# Patient Record
Sex: FEMALE | Race: WHITE | NOT HISPANIC OR LATINO | URBAN - METROPOLITAN AREA
[De-identification: names, ages, dates, MRNs, and addresses within clinical notes are randomized per-mention and may not be internally consistent; named-entity substitution may affect disease eponyms.]

---

## 2023-12-13 ENCOUNTER — OFFICE VISIT (OUTPATIENT)
Dept: URGENT CARE | Facility: CLINIC | Age: 23
End: 2023-12-13
Payer: COMMERCIAL

## 2023-12-13 VITALS
DIASTOLIC BLOOD PRESSURE: 72 MMHG | SYSTOLIC BLOOD PRESSURE: 116 MMHG | BODY MASS INDEX: 23.86 KG/M2 | HEART RATE: 93 BPM | HEIGHT: 67 IN | WEIGHT: 152 LBS | RESPIRATION RATE: 16 BRPM | OXYGEN SATURATION: 97 % | TEMPERATURE: 97.6 F

## 2023-12-13 DIAGNOSIS — B34.9 ACUTE VIRAL SYNDROME: Primary | ICD-10-CM

## 2023-12-13 LAB
SARS-COV-2 AG UPPER RESP QL IA: NEGATIVE
VALID CONTROL: NORMAL

## 2023-12-13 PROCEDURE — 87811 SARS-COV-2 COVID19 W/OPTIC: CPT | Performed by: PHYSICIAN ASSISTANT

## 2023-12-13 PROCEDURE — S9083 URGENT CARE CENTER GLOBAL: HCPCS | Performed by: PHYSICIAN ASSISTANT

## 2023-12-13 PROCEDURE — 99213 OFFICE O/P EST LOW 20 MIN: CPT | Performed by: PHYSICIAN ASSISTANT

## 2023-12-13 NOTE — PROGRESS NOTES
[facility]    NAME: Kareen oRwe is a 21 y.o. female  : 2000    MRN: [id number]  DATE: 2023  TIME: 3:44 PM    Assessment and Plan   Acute viral syndrome [B34.9]  1. Acute viral syndrome  Poct Covid 19 Rapid Antigen Test        Note given for work. Patient did not want antinausea medication. Patient Instructions     Patient Instructions   Rapid COVID test is negative at this time. There are no other viral illnesses going around right now that can cause nausea/vomiting as well as sore throat, cough, nasal congestion or drainage. Bed rest.  Fluids. Tylenol as needed. Note given for work. Most people find that fever chills may last for about 3 to 5 days. If any coughing nasal congestion or drainage develop those symptoms tend to peak about 7 to 10 days after onset and then slowly resolved. Chief Complaint     Chief Complaint   Patient presents with    Vomiting     Vomiting, chills, sweats and fever since yesterday. Friend is sick contact with the same. Drinking water all day with no emesis       History of Present Illness   Kareen Rowe presents to the clinic c/o  22-year-old female comes in with fever, coughing, chills, vomiting. Started: Yesterday. Associated signs and symptoms: Nausea and vomiting. Scratchy throat. Little bit of coughing with some chest discomfort. Last vomiting last evening. Is keeping fluids down. Modifying factors: Resting, fluids, Tylenol and lots of sleeping. Known Exposures: Friend recently with sweating and vomiting. Recent travel:  No.  Hx asthma:  No.  Hx pneumonia:  No.  Smoker: Occasional if drinking alcohol. Review of Systems   Review of Systems   Constitutional:  Positive for activity change, appetite change, chills, diaphoresis, fatigue and fever.    HENT:  Negative for congestion, ear discharge, ear pain, hearing loss, mouth sores, nosebleeds, postnasal drip, rhinorrhea, sinus pressure, sinus pain and sore throat. Eyes: Negative. Respiratory:  Positive for cough. Negative for apnea, choking, chest tightness, shortness of breath, wheezing and stridor. Cardiovascular:  Negative for chest pain, palpitations and leg swelling. Gastrointestinal: Negative. Musculoskeletal:  Positive for arthralgias and myalgias. Negative for neck stiffness. Skin:  Negative for rash. Neurological:  Positive for headaches. Negative for dizziness and light-headedness. Hematological:  Positive for adenopathy. Current Medications     No long-term medications on file. Current Allergies     Allergies as of 12/13/2023    (No Known Allergies)          The following portions of the patient's history were reviewed and updated as appropriate: allergies, current medications, past family history, past medical history, past social history, past surgical history and problem list.  Past Medical History:   Diagnosis Date    PFO (patent foramen ovale)      Past Surgical History:   Procedure Laterality Date    PATENT FORAMEN OVALE CLOSURE       History reviewed. No pertinent family history. Objective   /72   Pulse 93   Temp 97.6 °F (36.4 °C) (Tympanic)   Resp 16   Ht 5' 7" (1.702 m)   Wt 68.9 kg (152 lb)   LMP 10/31/2023 Comment: irreg menses  SpO2 97%   BMI 23.81 kg/m²   Patient's last menstrual period was 10/31/2023. Physical Exam     Physical Exam  Vitals and nursing note reviewed. Constitutional:       General: She is not in acute distress. Appearance: She is well-developed. She is ill-appearing. She is not toxic-appearing or diaphoretic. Comments: Appears mildly ill but in no acute distress. No trismus or conversational dyspnea. HENT:      Head: Normocephalic and atraumatic. Right Ear: Tympanic membrane, ear canal and external ear normal.      Left Ear: Tympanic membrane, ear canal and external ear normal.      Nose: Congestion present. No rhinorrhea.       Mouth/Throat:      Pharynx: No oropharyngeal exudate or posterior oropharyngeal erythema. Comments: Cobblestoning posterior pharynx with patchy redness. Tongue is dry and coated but buccal mucosa mildly moist  Eyes:      General:         Right eye: No discharge. Left eye: No discharge. Conjunctiva/sclera: Conjunctivae normal.      Pupils: Pupils are equal, round, and reactive to light. Cardiovascular:      Rate and Rhythm: Normal rate and regular rhythm. Heart sounds: Normal heart sounds. No murmur heard. No friction rub. No gallop. Pulmonary:      Effort: Pulmonary effort is normal. No respiratory distress. Breath sounds: Normal breath sounds. No stridor. No wheezing, rhonchi or rales. Musculoskeletal:      Cervical back: Normal range of motion and neck supple. No rigidity or tenderness. Lymphadenopathy:      Cervical: No cervical adenopathy. Skin:     General: Skin is warm and dry. Coloration: Skin is not jaundiced or pale. Findings: No rash. Comments: Good turgor   Neurological:      Mental Status: She is alert and oriented to person, place, and time.    Psychiatric:         Mood and Affect: Mood normal.         Behavior: Behavior normal.

## 2023-12-13 NOTE — PATIENT INSTRUCTIONS
Rapid COVID test is negative at this time. There are no other viral illnesses going around right now that can cause nausea/vomiting as well as sore throat, cough, nasal congestion or drainage. Bed rest.  Fluids. Tylenol as needed. Note given for work. Most people find that fever chills may last for about 3 to 5 days. If any coughing nasal congestion or drainage develop those symptoms tend to peak about 7 to 10 days after onset and then slowly resolved.

## 2024-01-25 ENCOUNTER — OFFICE VISIT (OUTPATIENT)
Dept: CARDIOLOGY CLINIC | Facility: CLINIC | Age: 24
End: 2024-01-25
Payer: COMMERCIAL

## 2024-01-25 VITALS
HEART RATE: 65 BPM | WEIGHT: 160 LBS | SYSTOLIC BLOOD PRESSURE: 120 MMHG | BODY MASS INDEX: 25.06 KG/M2 | DIASTOLIC BLOOD PRESSURE: 68 MMHG

## 2024-01-25 DIAGNOSIS — R01.0 INNOCENT HEART MURMUR: ICD-10-CM

## 2024-01-25 DIAGNOSIS — Q23.3 MITRAL REGURGITATION, CONGENITAL: ICD-10-CM

## 2024-01-25 DIAGNOSIS — Z87.74 STATUS POST PATCH CLOSURE OF ASD: ICD-10-CM

## 2024-01-25 DIAGNOSIS — Q21.20 ASD (ATRIAL SEPTAL DEFECT), PRIMUM: Primary | ICD-10-CM

## 2024-01-25 DIAGNOSIS — Q23.9 CONGENITAL CLEFT LEAFLET OF MITRAL VALVE: ICD-10-CM

## 2024-01-25 PROCEDURE — 99204 OFFICE O/P NEW MOD 45 MIN: CPT | Performed by: INTERNAL MEDICINE

## 2024-01-25 PROCEDURE — 93000 ELECTROCARDIOGRAM COMPLETE: CPT | Performed by: INTERNAL MEDICINE

## 2024-01-25 NOTE — PROGRESS NOTES
[facility]  [address] [place] [address]  Phone#  [phone]   Fax#  [phone]  *-*-*-*-*-*-*-*-*-*-*-*-*-*-*-*-*-*-*-*-*-*-*-*-*-*-*-*-*-*-*-*-*-*-*-*-*-*-*-*-*-*-*-*-*-*-*-*-*-*-*-*-*-*                                              Cardiology Consultation       ENCOUNTER DATE: 24 9:22 AM  PATIENT NAME: Kady Francois   : 2000    MRN: [id number]  AGE:23 y.o.      SEX: female  ENCOUNTER PROVIDER:Jone Rojas MD     PRIMARY CARE PHYSICIAN: No primary care provider on file.    ACTIVE DIAGNOSIS THIS VISIT  1. ASD (atrial septal defect), primum  Echo complete w/ contrast if indicated      2. Status post patch closure of ASD        3. Mitral regurgitation, congenital        4. Congenital cleft leaflet of mitral valve        5. Innocent heart murmur  POCT ECG        ACTIVE PROBLEM LIST  Patient Active Problem List   Diagnosis    ASD (atrial septal defect), primum    Status post patch closure of ASD    Congenital cleft leaflet of mitral valve    Mitral regurgitation, congenital    Innocent heart murmur       CARDIOLOGY SPECIALTY COMMENTS  Kady is a 14 year old female with a recent diagnosis of incomplete atrioventricular canal defect, who presents for incomplete AV canal repair. She was initially referred to cardiology for evaluation of syncope in 2014. Initially, her syncope was evaluated by Holter, which showed no significant arrhythmia, and exercise stress test, which revealed no significant exercise-induced ectopy or myocardial ischemia. OSH echo revealed a primum ASD with mild right heart enlargement. After being referred to [facility], [facility] echo on 14 demonstrated a large primum ASD, cleft MV, no VSD, normal RA size, mild RV dilation, trivial right AV valve regurgitation, mild left AV valve regurgitation, and normal biventricular function. She was referred to surgical repair due to her ASD size and exercise intolerance. She underwent repair of her  incomplete AV canal defect on 11/18/2014.    OR with Dr Wheeler: OR 11/18/2014: MV cleft repair, pericardial patch closure    Echo 11/21/2014:  1. Normal left ventricular size and qualitatively normal systolic shortening.  2. Normal right ventricular size and qualitatively normal systolic shortening.  3. There is no residual ostium primum atrial septal defect.  4. There is mild residual regurgitation of the mitral valve post surgery.  5. There is trivial residual right atrioventricular regurgitation.  6. Trivial TV insufficiency (2.3 m/s, 21 mmHg) - normal RVPE.  7. No left and no right atrioventricular valve stenosis.  8. No left ventricular outflow tract obstruction.  9. No pericardial effusion.  10. No left and trivial right pleural effusion present.  11. S/p Incomplete atrioventricular valve canal repair.  12. S/p repair of cleft mitral valve.     7/14/2021 echocardiogram:  Report status: Final. Summary:  1. S/p Incomplete atrioventricular valve canal repair.  2. Normal left ventricular size and qualitatively normal systolic shortening.  3. Normal right ventricular size and qualitatively normal systolic shortening.  4. There is mild residual right atrioventricular regurgitation.  5. There is mild to moderate residual left atrioventricular regurgitation.  6. No left ventricular outflow tract obstruction.  7. No significant changes compared to the prior study.     Needs for SBE prophylaxis    HPI:        Patient is a 23-year-old female who underwent partial AV canal correction at [facility] with a pericardial patch closure of a atrial septal defect, primum and repair of a cleft mitral valve.  Since that time she has been asymptomatic.  She is physically active.  She denies shortness of breath.  She denies palpitations or chest discomfort.    On examination she has no murmur and there are no unusual cardiac motions noted on palpation of her precordium.    She is interested in eventually being pregnant and wondered whether  her congenital heart disease would have affected pregnancy.    Records reviewed from [facility] and from her follow-up up until 2017.    DISCUSSION/PLAN:         Echo in [place] with Dr. Beavers  Return in 6 months      Results for orders placed or performed in visit on 01/25/24   POCT ECG    Narrative    Normal sinus rhythm at a rate of 65 bpm.  Left anterior hemiblock.  Abnormal EKG.       No current outpatient medications on file.  No Known Allergies    Past Medical History:   Diagnosis Date    PFO (patent foramen ovale)      Social History     Socioeconomic History    Marital status: Single     Spouse name: Not on file    Number of children: Not on file    Years of education: Not on file    Highest education level: Not on file   Occupational History    Not on file   Tobacco Use    Smoking status: Never    Smokeless tobacco: Never   Vaping Use    Vaping status: Never Used   Substance and Sexual Activity    Alcohol use: Never    Drug use: Never    Sexual activity: Not on file   Other Topics Concern    Not on file   Social History Narrative    Not on file     Social Determinants of Health     Financial Resource Strain: Not on file   Food Insecurity: Not on file   Transportation Needs: Not on file   Physical Activity: Not on file   Stress: Not on file   Social Connections: Not on file   Intimate Partner Violence: Not on file   Housing Stability: Not on file      No family history on file.  Past Surgical History:   Procedure Laterality Date    PATENT FORAMEN OVALE CLOSURE         Review of Systems:  Review of Systems   Constitutional: Negative.    HENT: Negative.     Respiratory:  Negative for cough, choking, chest tightness, shortness of breath, wheezing and stridor.    Cardiovascular:  Negative for chest pain, palpitations and leg swelling.   Gastrointestinal: Negative.    Endocrine: Negative.    Genitourinary: Negative.    Musculoskeletal: Negative.    Skin: Negative.    Allergic/Immunologic: Negative.     Neurological: Negative.    Hematological: Negative.    Psychiatric/Behavioral: Negative.         Physical Exam:  /68 (BP Location: Left arm, Patient Position: Sitting, Cuff Size: Adult)   Pulse 65   Wt 72.6 kg (160 lb)   BMI 25.06 kg/m²     PREVIOUS WEIGHTS:   Wt Readings from Last 10 Encounters:   01/25/24 72.6 kg (160 lb)   12/13/23 68.9 kg (152 lb)      Physical Exam  Constitutional:       General: She is not in acute distress.     Appearance: She is well-developed.   HENT:      Head: Normocephalic and atraumatic.   Neck:      Thyroid: No thyromegaly.      Vascular: No carotid bruit or JVD.      Trachea: No tracheal deviation.   Cardiovascular:      Rate and Rhythm: Normal rate and regular rhythm.      Pulses: Normal pulses.      Heart sounds: Normal heart sounds. No murmur heard.     No friction rub. No gallop.   Pulmonary:      Effort: Pulmonary effort is normal. No respiratory distress.      Breath sounds: Normal breath sounds. No wheezing, rhonchi or rales.   Chest:      Chest wall: No tenderness.   Abdominal:      General: Bowel sounds are normal. There is no distension.      Palpations: Abdomen is soft.      Tenderness: There is no abdominal tenderness.   Musculoskeletal:         General: Normal range of motion.      Cervical back: Normal range of motion and neck supple.      Right lower leg: No edema.      Left lower leg: No edema.   Skin:     General: Skin is warm and dry.   Neurological:      General: No focal deficit present.      Mental Status: She is alert and oriented to person, place, and time.      Gait: Gait normal.   Psychiatric:         Mood and Affect: Mood normal.         Behavior: Behavior normal.         Thought Content: Thought content normal.         Judgment: Judgment normal.       ======================================================   I have personally reviewed pertinent reports.      Portions of the record may have been created with voice recognition software. Occasional  "wrong word or \"sound a like\" substitutions may have occurred due to the inherent limitations of voice recognition software. Read the chart carefully and recognize, using context, where substitutions have occurred.    SIGNATURES:   Jone Rojas MD   "

## 2024-01-26 PROBLEM — Q21.20 ASD (ATRIAL SEPTAL DEFECT), PRIMUM: Status: ACTIVE | Noted: 2024-01-26

## 2024-01-26 PROBLEM — Z87.74 STATUS POST PATCH CLOSURE OF ASD: Status: ACTIVE | Noted: 2024-01-26

## 2024-01-26 PROBLEM — Q23.3 MITRAL REGURGITATION, CONGENITAL: Status: ACTIVE | Noted: 2024-01-26

## 2024-01-26 PROBLEM — R01.0 INNOCENT HEART MURMUR: Status: ACTIVE | Noted: 2024-01-26

## 2024-01-26 PROBLEM — Q23.9 CONGENITAL CLEFT LEAFLET OF MITRAL VALVE: Status: ACTIVE | Noted: 2024-01-26

## 2024-04-12 NOTE — PROGRESS NOTES
Assessment/Plan:      PCO reviewed. Testing ordered. Will notify pt of results. Will discuss hormonal BC once results are completed.     Recommended monthly SBE and annual CBE. ASCCP guidelines reviewed and pap collected today. STI testing ordered. HSV 2 reviewed and pt opted to include in testing. She is aware that condoms are recommended with all sexual contact for STI prevention. 100% condom use encouraged. Reviewed diet/activity recommendations.   Pt became lightheaded following pap. She was discharged after remaining in office for 30 mins in stable condition.     Diagnoses and all orders for this visit:    Encounter for gynecological examination (general) (routine) with abnormal findings    Encounter for Papanicolaou smear for cervical cancer screening  -     Liquid-based pap, screening    Secondary oligomenorrhea  -     Estradiol; Future  -     Follicle stimulating hormone; Future  -     Luteinizing hormone; Future  -     Prolactin; Future  -     TSH, 3rd generation with Free T4 reflex  -     Glucose, fasting; Future  -     DHEA-sulfate; Future  -     Testosterone, free, total; Future  -     US pelvis complete w transvaginal; Future    Screening examination for STD (sexually transmitted disease)  -     Trichomonas vaginalis Thin prep  -     Chlamydia/GC amplified DNA by PCR  -     Hepatitis B surface antigen; Future  -     RPR-Syphilis Screening (Total Syphilis IGG/IGM); Future  -     HIV 1/2 AG/AB w Reflex [facility] for 2 yr old and above; Future  -     HSV TYPE 1,2 DNA PCR; Future        Subjective:      Patient ID: Radha Snyder is a 24 y.o. female.    This new patient presents for routine annual gyn exam.   Pt states since the onset of menses, she has had long intervals between menses. She has never been tested for PCO. She has hx of acne. She was on an OCP for 5 years, stopped once she started a long term relationship with a female partner. Would like to restart as she is now has a male partner. She is not  "using condoms or BC. LMP 3/18/24. Reports 3 partners in the last 12 months. Requests STI testing.   She denies breast concerns, abn discharge, pelvic pain, bowel/bladder dysfunction.   Believes she had a pap in her teens.   Gardasil, believes she completed.         The following portions of the patient's history were reviewed and updated as appropriate: allergies, current medications, past family history, past medical history, past social history, past surgical history and problem list.    Review of Systems   Constitutional: Negative.    HENT: Negative.     Respiratory: Negative.     Cardiovascular: Negative.    Gastrointestinal: Negative.    Endocrine: Negative.    Genitourinary:  Positive for menstrual problem. Negative for difficulty urinating, dyspareunia, dysuria, frequency, pelvic pain, urgency, vaginal bleeding, vaginal discharge and vaginal pain.   Musculoskeletal: Negative.    Skin: Negative.    Neurological: Negative.    Psychiatric/Behavioral: Negative.           Objective:      /67   Ht 5' 7\" (1.702 m)   Wt 70.3 kg (155 lb)   LMP 03/18/2024 (Exact Date)   BMI 24.28 kg/m²          Physical Exam  Vitals and nursing note reviewed. Exam conducted with a chaperone present.   Constitutional:       Appearance: Normal appearance. She is well-developed.   HENT:      Head: Normocephalic and atraumatic.   Neck:      Thyroid: No thyroid mass or thyromegaly.   Cardiovascular:      Rate and Rhythm: Normal rate and regular rhythm.      Heart sounds: Normal heart sounds.   Pulmonary:      Effort: Pulmonary effort is normal.      Breath sounds: Normal breath sounds.   Chest:   Breasts:     Breasts are symmetrical.      Right: No inverted nipple, mass, nipple discharge, skin change or tenderness.      Left: No inverted nipple, mass, nipple discharge, skin change or tenderness.   Abdominal:      General: Bowel sounds are normal.      Palpations: Abdomen is soft.      Tenderness: There is no abdominal tenderness. "      Hernia: There is no hernia in the left inguinal area or right inguinal area.   Genitourinary:     General: Normal vulva.      Exam position: Supine.      Pubic Area: No rash.       Labia:         Right: No rash, tenderness, lesion or injury.         Left: No rash, tenderness, lesion or injury.       Urethra: No prolapse, urethral pain, urethral swelling or urethral lesion.      Vagina: Normal. No signs of injury and foreign body. No vaginal discharge, erythema, tenderness, bleeding, lesions or prolapsed vaginal walls.      Cervix: No cervical motion tenderness, discharge, friability, lesion, erythema, cervical bleeding or eversion.      Uterus: Not deviated, not enlarged, not fixed, not tender and no uterine prolapse.       Adnexa:         Right: No mass, tenderness or fullness.          Left: No mass, tenderness or fullness.        Rectum: No external hemorrhoid.      Comments: Urethra normal without lesions  No bladder tenderness  Musculoskeletal:         General: Normal range of motion.      Cervical back: Normal range of motion and neck supple.   Lymphadenopathy:      Lower Body: No right inguinal adenopathy. No left inguinal adenopathy.   Skin:     General: Skin is warm and dry.   Neurological:      Mental Status: She is alert and oriented to person, place, and time.   Psychiatric:         Speech: Speech normal.         Behavior: Behavior normal. Behavior is cooperative.

## 2024-04-15 ENCOUNTER — OFFICE VISIT (OUTPATIENT)
Dept: GYNECOLOGY | Facility: CLINIC | Age: 24
End: 2024-04-15
Payer: COMMERCIAL

## 2024-04-15 VITALS
HEIGHT: 67 IN | BODY MASS INDEX: 24.33 KG/M2 | SYSTOLIC BLOOD PRESSURE: 118 MMHG | WEIGHT: 155 LBS | DIASTOLIC BLOOD PRESSURE: 67 MMHG

## 2024-04-15 DIAGNOSIS — N91.4 SECONDARY OLIGOMENORRHEA: ICD-10-CM

## 2024-04-15 DIAGNOSIS — Z01.411 ENCOUNTER FOR GYNECOLOGICAL EXAMINATION (GENERAL) (ROUTINE) WITH ABNORMAL FINDINGS: Primary | ICD-10-CM

## 2024-04-15 DIAGNOSIS — Z12.4 ENCOUNTER FOR PAPANICOLAOU SMEAR FOR CERVICAL CANCER SCREENING: ICD-10-CM

## 2024-04-15 DIAGNOSIS — Z11.3 SCREENING EXAMINATION FOR STD (SEXUALLY TRANSMITTED DISEASE): ICD-10-CM

## 2024-04-15 PROCEDURE — 99385 PREV VISIT NEW AGE 18-39: CPT | Performed by: OBSTETRICS & GYNECOLOGY

## 2024-04-15 PROCEDURE — 87591 N.GONORRHOEAE DNA AMP PROB: CPT | Performed by: OBSTETRICS & GYNECOLOGY

## 2024-04-15 PROCEDURE — 87661 TRICHOMONAS VAGINALIS AMPLIF: CPT | Performed by: OBSTETRICS & GYNECOLOGY

## 2024-04-15 PROCEDURE — 87491 CHLMYD TRACH DNA AMP PROBE: CPT | Performed by: OBSTETRICS & GYNECOLOGY

## 2024-04-15 PROCEDURE — G0145 SCR C/V CYTO,THINLAYER,RESCR: HCPCS | Performed by: OBSTETRICS & GYNECOLOGY

## 2024-04-17 LAB
C TRACH DNA SPEC QL NAA+PROBE: NEGATIVE
N GONORRHOEA DNA SPEC QL NAA+PROBE: NEGATIVE
T VAGINALIS DNA SPEC QL NAA+PROBE: NEGATIVE

## 2024-04-22 ENCOUNTER — HOSPITAL ENCOUNTER (OUTPATIENT)
Dept: ULTRASOUND IMAGING | Facility: HOSPITAL | Age: 24
Discharge: HOME | End: 2024-04-22
Payer: COMMERCIAL

## 2024-04-22 ENCOUNTER — TELEPHONE (OUTPATIENT)
Dept: GYNECOLOGY | Facility: CLINIC | Age: 24
End: 2024-04-22

## 2024-04-22 DIAGNOSIS — N91.4 SECONDARY OLIGOMENORRHEA: ICD-10-CM

## 2024-04-22 LAB
LAB AP GYN PRIMARY INTERPRETATION: NORMAL
Lab: NORMAL
PATH INTERP SPEC-IMP: NORMAL

## 2024-04-22 PROCEDURE — 76830 TRANSVAGINAL US NON-OB: CPT

## 2024-04-22 PROCEDURE — 76856 US EXAM PELVIC COMPLETE: CPT

## 2024-04-22 NOTE — TELEPHONE ENCOUNTER
----- Message from DAVID Delgado sent at 4/22/2024 11:17 AM EDT -----  Pls inform pt that trich, GC/CT culture were normal. Pap was normal, but did show BV. Only need to treat if sx. If she is sx, can treat with metrogel for 5 nights. If pt is not sx, should not treat because she could get a yeast infection.

## 2024-04-22 NOTE — TELEPHONE ENCOUNTER
Spoke with pt and she is aware of results of pap. She is not symptomatic at this time so no need to treat. I advised her to call if symptoms of BV come back

## 2024-05-10 LAB
DHEA-S SERPL-MCNC: 255 UG/DL (ref 110–431.7)
ESTRADIOL SERPL-MCNC: 82 PG/ML
FSH SERPL-ACNC: 2.6 MIU/ML
GLUCOSE P FAST SERPL-MCNC: 72 MG/DL (ref 70–99)
HBV SURFACE AG SERPL QL IA: NEGATIVE
HIV 1+2 AB+HIV1 P24 AG SERPL QL IA: NON REACTIVE
LH SERPL-ACNC: 10.1 MIU/ML
PROLACTIN SERPL-MCNC: 35.9 NG/ML (ref 4.8–33.4)
RPR SER QL: NON REACTIVE
TESTOST FREE SERPL-MCNC: 1.6 PG/ML (ref 0–4.2)
TESTOST SERPL-MCNC: 37 NG/DL (ref 13–71)
TSH SERPL DL<=0.005 MIU/L-ACNC: 1.01 UIU/ML (ref 0.45–4.5)

## 2024-05-13 ENCOUNTER — TELEPHONE (OUTPATIENT)
Dept: GYNECOLOGY | Facility: CLINIC | Age: 24
End: 2024-05-13

## 2024-05-13 DIAGNOSIS — E28.2 PCOS (POLYCYSTIC OVARIAN SYNDROME): Primary | ICD-10-CM

## 2024-05-13 RX ORDER — NORETHINDRONE ACETATE AND ETHINYL ESTRADIOL 1MG-20(24)
1 KIT ORAL DAILY
Qty: 84 TABLET | Refills: 0 | Status: SHIPPED | OUTPATIENT
Start: 2024-05-13

## 2024-05-13 NOTE — TELEPHONE ENCOUNTER
I spoke to pt and relayed that she meets 2/3 criteria for PCOS, oligomenorrhea and left ovary 12 mls. She did not have elevated testosterone level. Options reviewed. Pt has chosen to restart OCP. Loni sent. Risks/benefits/instructions for use reviewed.  She denies personal hx of migraine with aura, lupus, blood clot or family hx of blood clots. She will RTO in 3 months for pill check.

## 2024-07-25 DIAGNOSIS — E28.2 PCOS (POLYCYSTIC OVARIAN SYNDROME): ICD-10-CM

## 2024-07-25 RX ORDER — NORETHINDRONE ACETATE AND ETHINYL ESTRADIOL 1MG-20(24)
1 KIT ORAL DAILY
Qty: 28 TABLET | Refills: 0 | Status: SHIPPED | OUTPATIENT
Start: 2024-07-25

## 2024-08-18 DIAGNOSIS — E28.2 PCOS (POLYCYSTIC OVARIAN SYNDROME): ICD-10-CM

## 2024-08-18 RX ORDER — NORETHINDRONE ACETATE AND ETHINYL ESTRADIOL 1MG-20(24)
1 KIT ORAL DAILY
Qty: 84 TABLET | Refills: 1 | Status: SHIPPED | OUTPATIENT
Start: 2024-08-18 | End: 2024-08-26 | Stop reason: SDUPTHER

## 2024-08-23 NOTE — PROGRESS NOTES
"Assessment/Plan:    We reviewed the use/risks/benefits of OC use.  Since the patient denies having any problems, she will continue with the same OC.   She will call or RTO with any questions/concerns, otherwise RTO in April for annual exam.       Diagnoses and all orders for this visit:    PCOS (polycystic ovarian syndrome)  -     norethindrone-ethinyl estradiol-ferrous fumarate (Blisovi 24 Fe) 1-20 MG-MCG(24) per tablet; Take 1 tablet by mouth daily        Subjective:      Patient ID: Radha Snyder is a 24 y.o. female.    Pt presents for pill check.   She is on Blisovi 24 for PCOS and contraception. She is taking OCP daily and has regular menses. She is pleased with pill and would like to continue. She denies SE.  Last pap 4/15/24.  She is sexually active, declines STI testing.         The following portions of the patient's history were reviewed and updated as appropriate: allergies, current medications, past family history, past medical history, past social history, past surgical history and problem list.    Review of Systems   Constitutional: Negative.    HENT: Negative.     Respiratory: Negative.     Cardiovascular: Negative.    Gastrointestinal: Negative.    Endocrine: Negative.    Genitourinary:  Negative for difficulty urinating, dyspareunia, dysuria, frequency, menstrual problem, pelvic pain, urgency, vaginal bleeding, vaginal discharge and vaginal pain.   Musculoskeletal: Negative.    Skin: Negative.    Neurological: Negative.    Psychiatric/Behavioral: Negative.           Objective:      /62 (BP Location: Left arm, Patient Position: Sitting, Cuff Size: Standard)   Pulse 94   Ht 5' 7\" (1.702 m)   Wt 70.3 kg (155 lb)   LMP 07/29/2024 (Exact Date)   BMI 24.28 kg/m²          Physical Exam  Vitals and nursing note reviewed.   Constitutional:       Appearance: Normal appearance.   HENT:      Head: Normocephalic and atraumatic.   Pulmonary:      Effort: Pulmonary effort is normal.   Musculoskeletal: "         General: Normal range of motion.      Cervical back: Normal range of motion.   Skin:     General: Skin is warm and dry.   Neurological:      Mental Status: She is alert and oriented to person, place, and time.   Psychiatric:         Mood and Affect: Mood normal.         Behavior: Behavior normal.         Thought Content: Thought content normal.         Judgment: Judgment normal.

## 2024-08-26 ENCOUNTER — OFFICE VISIT (OUTPATIENT)
Dept: GYNECOLOGY | Facility: CLINIC | Age: 24
End: 2024-08-26
Payer: COMMERCIAL

## 2024-08-26 VITALS
HEIGHT: 67 IN | BODY MASS INDEX: 24.33 KG/M2 | SYSTOLIC BLOOD PRESSURE: 109 MMHG | HEART RATE: 94 BPM | WEIGHT: 155 LBS | DIASTOLIC BLOOD PRESSURE: 62 MMHG

## 2024-08-26 DIAGNOSIS — E28.2 PCOS (POLYCYSTIC OVARIAN SYNDROME): ICD-10-CM

## 2024-08-26 PROCEDURE — 99212 OFFICE O/P EST SF 10 MIN: CPT | Performed by: OBSTETRICS & GYNECOLOGY

## 2024-08-26 RX ORDER — NORETHINDRONE ACETATE AND ETHINYL ESTRADIOL 1MG-20(24)
1 KIT ORAL DAILY
Qty: 84 TABLET | Refills: 3 | Status: SHIPPED | OUTPATIENT
Start: 2024-08-26